# Patient Record
Sex: FEMALE | Race: WHITE | NOT HISPANIC OR LATINO | URBAN - METROPOLITAN AREA
[De-identification: names, ages, dates, MRNs, and addresses within clinical notes are randomized per-mention and may not be internally consistent; named-entity substitution may affect disease eponyms.]

---

## 2019-04-29 ENCOUNTER — EMERGENCY (EMERGENCY)
Facility: HOSPITAL | Age: 74
LOS: 0 days | Discharge: HOME | End: 2019-04-30
Admitting: HOSPITALIST

## 2019-04-29 ENCOUNTER — INPATIENT (INPATIENT)
Facility: HOSPITAL | Age: 74
LOS: 0 days | Discharge: HOME | End: 2019-04-30
Attending: HOSPITALIST | Admitting: HOSPITALIST
Payer: MEDICARE

## 2019-04-29 ENCOUNTER — TRANSCRIPTION ENCOUNTER (OUTPATIENT)
Age: 74
End: 2019-04-29

## 2019-04-29 VITALS
DIASTOLIC BLOOD PRESSURE: 74 MMHG | SYSTOLIC BLOOD PRESSURE: 139 MMHG | HEART RATE: 72 BPM | TEMPERATURE: 98 F | RESPIRATION RATE: 18 BRPM | OXYGEN SATURATION: 97 % | WEIGHT: 154.98 LBS

## 2019-04-29 DIAGNOSIS — C90.00 MULTIPLE MYELOMA NOT HAVING ACHIEVED REMISSION: ICD-10-CM

## 2019-04-29 DIAGNOSIS — R55 SYNCOPE AND COLLAPSE: ICD-10-CM
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
## 2019-04-29 DIAGNOSIS — S02.2XXA FRACTURE OF NASAL BONES, INITIAL ENCOUNTER FOR CLOSED FRACTURE: ICD-10-CM

## 2019-04-29 DIAGNOSIS — Z85.3 PERSONAL HISTORY OF MALIGNANT NEOPLASM OF BREAST: ICD-10-CM

## 2019-04-29 DIAGNOSIS — Z90.11 ACQUIRED ABSENCE OF RIGHT BREAST AND NIPPLE: Chronic | ICD-10-CM

## 2019-04-29 DIAGNOSIS — Z96.659 PRESENCE OF UNSPECIFIED ARTIFICIAL KNEE JOINT: Chronic | ICD-10-CM

## 2019-04-29 DIAGNOSIS — Z85.820 PERSONAL HISTORY OF MALIGNANT MELANOMA OF SKIN: ICD-10-CM
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
## 2019-04-29 DIAGNOSIS — I26.99 OTHER PULMONARY EMBOLISM WITHOUT ACUTE COR PULMONALE: ICD-10-CM

## 2019-04-29 LAB
ALBUMIN SERPL ELPH-MCNC: 4 G/DL — SIGNIFICANT CHANGE UP (ref 3.5–5.2)
ALP SERPL-CCNC: 105 U/L — SIGNIFICANT CHANGE UP (ref 30–115)
ALT FLD-CCNC: 32 U/L — SIGNIFICANT CHANGE UP (ref 0–41)
ANION GAP SERPL CALC-SCNC: 15 MMOL/L — HIGH (ref 7–14)
APTT BLD: 23.8 SEC — CRITICAL LOW (ref 27–39.2)
AST SERPL-CCNC: 26 U/L — SIGNIFICANT CHANGE UP (ref 0–41)
BASOPHILS # BLD AUTO: 0.03 K/UL — SIGNIFICANT CHANGE UP (ref 0–0.2)
BASOPHILS NFR BLD AUTO: 0.3 % — SIGNIFICANT CHANGE UP (ref 0–1)
BILIRUB SERPL-MCNC: 0.5 MG/DL — SIGNIFICANT CHANGE UP (ref 0.2–1.2)
BLD GP AB SCN SERPL QL: SIGNIFICANT CHANGE UP
BUN SERPL-MCNC: 34 MG/DL — HIGH (ref 10–20)
CALCIUM SERPL-MCNC: 9.1 MG/DL — SIGNIFICANT CHANGE UP (ref 8.5–10.1)
CHLORIDE SERPL-SCNC: 104 MMOL/L — SIGNIFICANT CHANGE UP (ref 98–110)
CO2 SERPL-SCNC: 22 MMOL/L — SIGNIFICANT CHANGE UP (ref 17–32)
CREAT SERPL-MCNC: 1.3 MG/DL — SIGNIFICANT CHANGE UP (ref 0.7–1.5)
EOSINOPHIL # BLD AUTO: 0.16 K/UL — SIGNIFICANT CHANGE UP (ref 0–0.7)
EOSINOPHIL NFR BLD AUTO: 1.4 % — SIGNIFICANT CHANGE UP (ref 0–8)
GLUCOSE SERPL-MCNC: 98 MG/DL — SIGNIFICANT CHANGE UP (ref 70–99)
HCT VFR BLD CALC: 31.4 % — LOW (ref 37–47)
HGB BLD-MCNC: 9.9 G/DL — LOW (ref 12–16)
IMM GRANULOCYTES NFR BLD AUTO: 2.3 % — HIGH (ref 0.1–0.3)
INR BLD: 1.03 RATIO — SIGNIFICANT CHANGE UP (ref 0.65–1.3)
LACTATE SERPL-SCNC: 2.2 MMOL/L — SIGNIFICANT CHANGE UP (ref 0.5–2.2)
LYMPHOCYTES # BLD AUTO: 26.8 % — SIGNIFICANT CHANGE UP (ref 20.5–51.1)
LYMPHOCYTES # BLD AUTO: 3.04 K/UL — SIGNIFICANT CHANGE UP (ref 1.2–3.4)
MCHC RBC-ENTMCNC: 28.1 PG — SIGNIFICANT CHANGE UP (ref 27–31)
MCHC RBC-ENTMCNC: 31.5 G/DL — LOW (ref 32–37)
MCV RBC AUTO: 89.2 FL — SIGNIFICANT CHANGE UP (ref 81–99)
MONOCYTES # BLD AUTO: 0.92 K/UL — HIGH (ref 0.1–0.6)
MONOCYTES NFR BLD AUTO: 8.1 % — SIGNIFICANT CHANGE UP (ref 1.7–9.3)
NEUTROPHILS # BLD AUTO: 6.92 K/UL — HIGH (ref 1.4–6.5)
NEUTROPHILS NFR BLD AUTO: 61.1 % — SIGNIFICANT CHANGE UP (ref 42.2–75.2)
NRBC # BLD: 0 /100 WBCS — SIGNIFICANT CHANGE UP (ref 0–0)
NT-PROBNP SERPL-SCNC: 955 PG/ML — HIGH (ref 0–300)
PLATELET # BLD AUTO: 94 K/UL — LOW (ref 130–400)
POTASSIUM SERPL-MCNC: 3.5 MMOL/L — SIGNIFICANT CHANGE UP (ref 3.5–5)
POTASSIUM SERPL-SCNC: 3.5 MMOL/L — SIGNIFICANT CHANGE UP (ref 3.5–5)
PROT SERPL-MCNC: 5.9 G/DL — LOW (ref 6–8)
PROTHROM AB SERPL-ACNC: 11.8 SEC — SIGNIFICANT CHANGE UP (ref 9.95–12.87)
RBC # BLD: 3.52 M/UL — LOW (ref 4.2–5.4)
RBC # FLD: 17.3 % — HIGH (ref 11.5–14.5)
SODIUM SERPL-SCNC: 141 MMOL/L — SIGNIFICANT CHANGE UP (ref 135–146)
TROPONIN T SERPL-MCNC: 0.03 NG/ML — CRITICAL HIGH
TYPE + AB SCN PNL BLD: SIGNIFICANT CHANGE UP
WBC # BLD: 11.33 K/UL — HIGH (ref 4.8–10.8)
WBC # FLD AUTO: 11.33 K/UL — HIGH (ref 4.8–10.8)

## 2019-04-29 PROCEDURE — 71275 CT ANGIOGRAPHY CHEST: CPT | Mod: 26

## 2019-04-29 PROCEDURE — 70450 CT HEAD/BRAIN W/O DYE: CPT | Mod: 26

## 2019-04-29 PROCEDURE — 71045 X-RAY EXAM CHEST 1 VIEW: CPT | Mod: 26

## 2019-04-29 PROCEDURE — 99285 EMERGENCY DEPT VISIT HI MDM: CPT | Mod: GC

## 2019-04-29 PROCEDURE — 70486 CT MAXILLOFACIAL W/O DYE: CPT | Mod: 26

## 2019-04-29 NOTE — ED PROVIDER NOTE - CARE PLAN
Principal Discharge DX:	Pulmonary embolism  Secondary Diagnosis:	Syncope  Secondary Diagnosis:	Nasal bone fracture

## 2019-04-29 NOTE — ED PROVIDER NOTE - PROGRESS NOTE DETAILS
Endorsed to Dr Yin to follow up imaging studies, reassess and dispo. Pt was signed out to me by Dr. Chaudhari at [id number].  All workup prior to this time was initiated by that provider.  Plan to review labs and imaging and reassess. Pt with R lung PEs.  Pt aware of need for admission and blood thinners.  Also told about possible pulmonary infarct on L and nasal bone fractures.  No h/o GIB. No current BRBPR, melena, or hematemesis.

## 2019-04-29 NOTE — ED PROVIDER NOTE - ATTENDING CONTRIBUTION TO CARE
74 yo female PMH as noted here for evaluation of facial ecchymosis s/p syncope /fall a few days ago in [place], also reported non-bloody diarrhea .  Patient reports feeling lightheaded, tried to hold on to a chair, but passed out and found herself of the floor.  Had a similar episode a few months ago under similar circumstances, reports having an echo and a stress test last December and they were reportedly negative.  Denies having any CP or palpitations, but reports exertional SOB which has been present for months.  Recent lower extremity duplex was negative for clots , as reported by the patient.  Well-appearing elderly female, smiling, NAD, PERRL, supple neck without midline spine ttp, + rt sided facial ecchymosis, but no palpable fx, nml work of breathing, speaking full sentences, RRR, abdomen soft, NT/ ND, no calf ttp, distal pulses intact.  Will check labs, obtain imaging studies, reassess.

## 2019-04-29 NOTE — ED PROVIDER NOTE - CLINICAL SUMMARY MEDICAL DECISION MAKING FREE TEXT BOX
pt with intermittent sob and recent syncopal episode with fall just prior to coming here for a visit.  Pt with h/o MM on chemo.  Found to have PEs here in R lung, possible L sided infarct and nasal bone fracture most likely related to her recent syncopal event.  Pt started on anticoagulant and admitted for continued care.

## 2019-04-29 NOTE — ED PROVIDER NOTE - OBJECTIVE STATEMENT
74 yo F pmh of breast ca, multiple myeloma, melanoma presents after a syncopal episodes. States that she lives in [place]. On saturday she was in [place] at home when she syncopized and landed onto the right side of her face. She did not seek help and got on her originally planned flight to [place] to visit her sister. Since the flight she has bruising to the right side of her face. Has been having intermittent shortness of breath, as well as non bloody diarrhea. no fevers, no cp, no palpitations. no subsequent episodes of syncope. Called her oncologist in [place] who advised her to come to the ED.

## 2019-04-29 NOTE — ED ADULT NURSE NOTE - NSIMPLEMENTINTERV_GEN_ALL_ED
Implemented All Fall with Harm Risk Interventions:  [place] to call system. Call bell, personal items and telephone within reach. Instruct patient to call for assistance. Room bathroom lighting operational. Non-slip footwear when patient is off stretcher. Physically safe environment: no spills, clutter or unnecessary equipment. Stretcher in lowest position, wheels locked, appropriate side rails in place. Provide visual cue, wrist band, yellow gown, etc. Monitor gait and stability. Monitor for mental status changes and reorient to person, place, and time. Review medications for side effects contributing to fall risk. Reinforce activity limits and safety measures with patient and family. Provide visual clues: red socks.

## 2019-04-29 NOTE — ED PROVIDER NOTE - NS ED ROS FT
Eyes:  No visual changes, eye pain or discharge.  ENMT:  no sore throat or runny nose, no difficulty swallowing., + bruising to the right side of her face.   Cardiac:  No chest pain, SOB   Respiratory:  No cough or respiratory distress.    GI:  No nausea, vomiting, or abdominal pain. + diarrhea x 3 days non bloody   :  No dysuria, frequency or burning.  MS:  + right leg swelling x 10 days  Neuro:  No headache or weakness.  No LOC. episode of syncope 3 days ago, no dizziness or headaches since.   Skin:  +  bruising to the right side of her face.   Endocrine: No history of thyroid disease or diabetes.

## 2019-04-29 NOTE — ED ADULT TRIAGE NOTE - CHIEF COMPLAINT QUOTE
Pt sts she has bilateral lympadema to arms, multiple myeloma and breast CA and is currently being treated with chemotherapy; pt sts that on Saturday morning she got lightheaded and passed out, hitting face causing bruising to right cheek/eye; pt is not on blood thinners; pt sts she went to urgent care center today because she felt weak and they sent her to ED

## 2019-04-29 NOTE — ED PROVIDER NOTE - PHYSICAL EXAMINATION
CONSTITUTIONAL: Well-developed; well-nourished; in no acute distress.   SKIN: + ecchymosis to the right face.   HEAD: Normocephalic; atraumatic.  EYES: PERRL, EOMI, no conjunctival erythema  ENT: No nasal discharge; airway clear.  NECK: Supple; non tender.  CARD: S1, S2 normal;  Regular rate and rhythm.   RESP: No wheezes, rales or rhonchi.  ABD: soft non tender, non distended, no rebound or guarding  EXT: Normal ROM.  5/5 strength in all 4 extremities. + right lower extremity edema, mild calf tenderness   LYMPH: No acute cervical adenopathy.  NEURO: Alert, oriented, grossly unremarkable. cn 2-11 intact, equal sensation bilaterally

## 2019-04-30 ENCOUNTER — TRANSCRIPTION ENCOUNTER (OUTPATIENT)
Age: 74
End: 2019-04-30

## 2019-04-30 VITALS
TEMPERATURE: 97 F | DIASTOLIC BLOOD PRESSURE: 60 MMHG | HEART RATE: 66 BPM | OXYGEN SATURATION: 94 % | SYSTOLIC BLOOD PRESSURE: 146 MMHG | RESPIRATION RATE: 18 BRPM

## 2019-04-30 DIAGNOSIS — I26.99 OTHER PULMONARY EMBOLISM WITHOUT ACUTE COR PULMONALE: ICD-10-CM

## 2019-04-30 DIAGNOSIS — R09.89 OTHER SPECIFIED SYMPTOMS AND SIGNS INVOLVING THE CIRCULATORY AND RESPIRATORY SYSTEMS: ICD-10-CM

## 2019-04-30 LAB
CK MB CFR SERPL CALC: 2.5 NG/ML — SIGNIFICANT CHANGE UP (ref 0.6–6.3)
CK SERPL-CCNC: 226 U/L — HIGH (ref 0–225)
HCV AB S/CO SERPL IA: 0.05 S/CO — SIGNIFICANT CHANGE UP (ref 0–0.99)
HCV AB SERPL-IMP: SIGNIFICANT CHANGE UP
TROPONIN T SERPL-MCNC: 0.01 NG/ML — SIGNIFICANT CHANGE UP

## 2019-04-30 PROCEDURE — 93306 TTE W/DOPPLER COMPLETE: CPT | Mod: 26

## 2019-04-30 PROCEDURE — 93970 EXTREMITY STUDY: CPT | Mod: 26

## 2019-04-30 PROCEDURE — 99282 EMERGENCY DEPT VISIT SF MDM: CPT

## 2019-04-30 RX ORDER — ASPIRIN/CALCIUM CARB/MAGNESIUM 324 MG
81 TABLET ORAL DAILY
Qty: 0 | Refills: 0 | Status: DISCONTINUED | OUTPATIENT
Start: 2019-04-30 | End: 2019-04-30

## 2019-04-30 RX ORDER — FUROSEMIDE 40 MG
20 TABLET ORAL DAILY
Qty: 0 | Refills: 0 | Status: DISCONTINUED | OUTPATIENT
Start: 2019-04-30 | End: 2019-04-30

## 2019-04-30 RX ORDER — ENOXAPARIN SODIUM 100 MG/ML
70 INJECTION SUBCUTANEOUS ONCE
Qty: 0 | Refills: 0 | Status: COMPLETED | OUTPATIENT
Start: 2019-04-30 | End: 2019-04-30

## 2019-04-30 RX ORDER — FUROSEMIDE 40 MG
1 TABLET ORAL
Qty: 0 | Refills: 0 | COMMUNITY

## 2019-04-30 RX ORDER — BORTEZOMIB 2.5 MG/1
0 INJECTION INTRAVENOUS
Qty: 0 | Refills: 0 | COMMUNITY

## 2019-04-30 RX ORDER — PANTOPRAZOLE SODIUM 20 MG/1
40 TABLET, DELAYED RELEASE ORAL
Qty: 0 | Refills: 0 | Status: DISCONTINUED | OUTPATIENT
Start: 2019-04-30 | End: 2019-04-30

## 2019-04-30 RX ORDER — DEXAMETHASONE 0.5 MG/5ML
0 ELIXIR ORAL
Qty: 0 | Refills: 0 | COMMUNITY

## 2019-04-30 RX ORDER — ENOXAPARIN SODIUM 100 MG/ML
70 INJECTION SUBCUTANEOUS
Qty: 0 | Refills: 0 | Status: DISCONTINUED | OUTPATIENT
Start: 2019-04-30 | End: 2019-04-30

## 2019-04-30 RX ORDER — LENALIDOMIDE 5 MG/1
0 CAPSULE ORAL
Qty: 0 | Refills: 0 | COMMUNITY

## 2019-04-30 RX ORDER — ASPIRIN/CALCIUM CARB/MAGNESIUM 324 MG
1 TABLET ORAL
Qty: 0 | Refills: 0 | COMMUNITY

## 2019-04-30 RX ORDER — APIXABAN 2.5 MG/1
2 TABLET, FILM COATED ORAL
Qty: 120 | Refills: 0 | OUTPATIENT
Start: 2019-04-30 | End: 2019-05-29

## 2019-04-30 RX ADMIN — ENOXAPARIN SODIUM 70 MILLIGRAM(S): 100 INJECTION SUBCUTANEOUS at 02:42

## 2019-04-30 RX ADMIN — Medication 81 MILLIGRAM(S): at 11:24

## 2019-04-30 RX ADMIN — Medication 20 MILLIGRAM(S): at 06:16

## 2019-04-30 RX ADMIN — PANTOPRAZOLE SODIUM 40 MILLIGRAM(S): 20 TABLET, DELAYED RELEASE ORAL at 06:16

## 2019-04-30 NOTE — H&P ADULT - NSHPLABSRESULTS_GEN_ALL_CORE
9.9    11.33 )-----------( 94       ( 29 Apr 2019 21:00 )             31.4       04-29    141  |  104  |  34<H>  ----------------------------<  98  3.5   |  22  |  1.3    Ca    9.1      29 Apr 2019 21:00    TPro  5.9<L>  /  Alb  4.0  /  TBili  0.5  /  DBili  x   /  AST  26  /  ALT  32  /  AlkPhos  105  04-29                  PT/INR - ( 29 Apr 2019 21:00 )   PT: 11.80 sec;   INR: 1.03 ratio         PTT - ( 29 Apr 2019 21:00 )  PTT:23.8 sec    Lactate Trend  04-29 @ 21:00 Lactate:2.2       CARDIAC MARKERS ( 29 Apr 2019 21:00 )  x     / 0.03 ng/mL / x     / x     / x            CAPILLARY BLOOD GLUCOSE            < from: CT Angio Chest w/ IV Cont (04.29.19 @ 23:55) >      IMPRESSION:    Multiple right lower lobe pulmonary emboli. No evidence of right heart   strain.    Left lower lobe peripheral wedge-shaped density, may represent an area of   pulmonary infarct.    < end of copied text >

## 2019-04-30 NOTE — H&P ADULT - NSHPPHYSICALEXAM_GEN_ALL_CORE
PHYSICAL EXAM:  GENERAL: NAD, speaks in full sentences, no signs of respiratory distress  HEAD:  R orbit ecchymoses , Normocephalic  EYES: EOMI, PERRLA, conjunctiva and sclera clear  NECK: Supple, No JVD  CHEST/LUNG: Clear to auscultation bilaterally; No wheeze; No crackles; No accessory muscles used  HEART: Regular rate and rhythm; No murmurs;   ABDOMEN: Soft, Nontender, Nondistended; Bowel sounds present; No guarding  EXTREMITIES:  2+ Peripheral Pulses, No cyanosis or edema  PSYCH: AAOx3  NEUROLOGY: non-focal  SKIN: No rashes or lesions

## 2019-04-30 NOTE — DISCHARGE NOTE PROVIDER - CARE PROVIDER_API CALL
Jayden Ponce)  Critical Care Medicine; Pulmonary Disease; Sleep Medicine  [address] [place] [address]  Phone: [phone]  Fax: [phone]  Follow Up Time:     Marilu Galeas  Phone: (   )    -  Fax: (   )    -  Follow Up Time:

## 2019-04-30 NOTE — DISCHARGE NOTE NURSING/CASE MANAGEMENT/SOCIAL WORK - NSDCDPATPORTLINK_GEN_ALL_CORE
You can access the [facility] Patient Portal, offered by [facility], by registering with the following website: [email or web address]

## 2019-04-30 NOTE — DISCHARGE NOTE PROVIDER - PROVIDER TOKENS
PROVIDER:[TOKEN:[[id number]]],FREE:[LAST:[Jomilya],FIRST:[Marilu],PHONE:[(   )    -],FAX:[(   )    -]]

## 2019-04-30 NOTE — CONSULT NOTE ADULT - ATTENDING COMMENTS
Pt seen and examined at bedside with [facility] resident    Agree with above Consult    Pt endorses remote history of rhinoplasty    right lateral periorbital ecchymosis  EOMI BL , no entrapment  nose midline, nontender, no septal hematoma    CT max face reviewed: no acute nasal fracture    A/P: No acute nasal trauma    -no surgical intervention indicated

## 2019-04-30 NOTE — DISCHARGE NOTE PROVIDER - NSDCCPCAREPLAN_GEN_ALL_CORE_FT
PRINCIPAL DISCHARGE DIAGNOSIS  Diagnosis: Pulmonary embolism  Assessment and Plan of Treatment: Take Eliquis 10mg twice a day for 7 days, then 5mg twice a day   Followup with your oncologist to determine how long to continue      SECONDARY DISCHARGE DIAGNOSES  Diagnosis: Nasal bone fracture  Assessment and Plan of Treatment: Please follow up with Dr. Clay or [facility] in [place] regarding the fracture    Diagnosis: Syncope  Assessment and Plan of Treatment:

## 2019-04-30 NOTE — DISCHARGE NOTE PROVIDER - NSDCCPTREATMENT_GEN_ALL_CORE_FT
PRINCIPAL PROCEDURE  Procedure: CTA chest without then with IV contrast  Findings and Treatment: Multiple right lower lobe emboli, and left lobe wedge shaped density, possible infarct      SECONDARY PROCEDURE  Procedure: CT maxillofacial area  Findings and Treatment: Age indeterminate mildy displaced nasal fracture

## 2019-04-30 NOTE — H&P ADULT - NSHPSOCIALHISTORY_GEN_ALL_CORE
Marital Status:  (  x )    (   ) Single    (   )    (  )   Occupation:   Lives with: (  ) alone  (  ) children   ( x ) spouse   (  ) parents  (  ) other  Recent Travel: yes traveled from [place] to [facility] 4/27/19     Substance Use (street drugs): (  ) never used  (  ) other:  Tobacco Usage:  (   ) never smoked   (   x) former smoker   (   ) current smoker  (     ) pack year  Alcohol Usage: no  Sexual History: no

## 2019-04-30 NOTE — H&P ADULT - HISTORY OF PRESENT ILLNESS
Patient is a 74 yo F PMH  of Breast ca, Multiple Myeloma on Revlimid and Velcade , Melanoma presents for shortness of breath x 2 days. History goes back to Saturday morning where patient fell weak tried to grab onto a chair and passed out. She is unsure of how long she is down for and states she fell forward onto her face. She denied tongue biting, bowel/bladder incontinence, jerking movements, confusion after the fall. Shortly after she took a flight to [place].When she landed she noticed that she was getting short of breath when she exerted herself. When she rested she felt better. The next day her shortness of breath progressed to where if she took 1 or 2 steps she would feels short of breath. Today she was at rest on the couch when she became short of breath. Her sister became worried and took her to an urgent care who recommended that she come to the hospital. She denies chest pain, palpitations, n/v/f/c, abdominal pain, night sweats, weight loss. She did admit to RLE swelling that began about one month ago. She saw her doctor who did a duplex which was negative and she was given compression stocking. She also admits to non bloody diarrhea for the past 2 days

## 2019-04-30 NOTE — CONSULT NOTE ADULT - SUBJECTIVE AND OBJECTIVE BOX
Patient is a 73y old  Female who presents with a chief complaint of Syncope, Shortness of breath (30 Apr 2019 10:19)      HPI:  Patient is a 72 yo F PMH  of Breast ca, Multiple Myeloma on Revlimid and Velcade , Melanoma presents for shortness of breath x 2 days. History goes back to Saturday morning where patient fell weak tried to grab onto a chair and passed out. She is unsure of how long she is down for and states she fell forward onto her face. She denied tongue biting, bowel/bladder incontinence, jerking movements, confusion after the fall. Shortly after she took a flight to [place].When she landed she noticed that she was getting short of breath when she exerted herself. When she rested she felt better. The next day her shortness of breath progressed to where if she took 1 or 2 steps she would feels short of breath. Today she was at rest on the couch when she became short of breath. Her sister became worried and took her to an urgent care who recommended that she come to the hospital. She denies chest pain, palpitations, n/v/f/c, abdominal pain, night sweats, weight loss. She did admit to RLE swelling that began about one month ago. She saw her doctor who did a duplex which was negative and she was given compression stocking. She also admits to non bloody diarrhea for the past 2 days (30 Apr 2019 02:00)    Reports being diagnosed with Myeloma ~Oct last year. Currently on chemo. States symptoms started in [place] on Sat. Felt SOB, weak, fell onto face. Flew to [facility] anyways. Reports worsening symptoms since being here.    PAST MEDICAL & SURGICAL HISTORY:  Melanoma  Multiple myeloma  Breast CA  History of knee replacement  H/O mastectomy, right    SOCIAL HX:   Smoking-remote history, quit in 80's    FAMILY HISTORY:  FH: myocardial infarction  No cardiovascular or pulmonary family history     Review of System:  See HPI    Allergies    Sulfac 10% (Hives)    Intolerances    PHYSICAL EXAM  Vital Signs Last 24 Hrs  T(C): 36.5 (30 Apr 2019 08:05), Max: 36.9 (30 Apr 2019 03:07)  T(F): 97.7 (30 Apr 2019 08:05), Max: 98.4 (30 Apr 2019 03:07)  HR: 65 (30 Apr 2019 08:05) (65 - 72)  BP: 127/72 (30 Apr 2019 08:05) (120/60 - 139/74)  BP(mean): --  RR: 18 (30 Apr 2019 08:05) (18 - 18)  SpO2: 97% (30 Apr 2019 08:05) (97% - 98%)    General: In NAD  HEENT: R eye ecchymosis noted           Lungs: CTAB b/l, no w/r/r  Cardiovascular: Regular  Gastrointestinal: Soft. + BS   Musculoskeletal: No Clubbing. Full range of motion. Moves all extremities  Skin: Warm. Intact  Neurological: No motor or sensory deficit     LABS:                        9.9    11.33 )-----------( 94       ( 29 Apr 2019 21:00 )             31.4                                               04-29    141  |  104  |  34<H>  ----------------------------<  98  3.5   |  22  |  1.3    Ca    9.1      29 Apr 2019 21:00    TPro  5.9<L>  /  Alb  4.0  /  TBili  0.5  /  DBili  x   /  AST  26  /  ALT  32  /  AlkPhos  105  04-29    PT/INR - ( 29 Apr 2019 21:00 )   PT: 11.80 sec;   INR: 1.03 ratio       PTT - ( 29 Apr 2019 21:00 )  PTT:23.8 sec                                           CARDIAC MARKERS ( 30 Apr 2019 05:16 )  x     / 0.01 ng/mL / 226 U/L / x     / 2.5 ng/mL  CARDIAC MARKERS ( 29 Apr 2019 21:00 )  x     / 0.03 ng/mL / x     / x     / x                                                LIVER FUNCTIONS - ( 29 Apr 2019 21:00 )  Alb: 4.0 g/dL / Pro: 5.9 g/dL / ALK PHOS: 105 U/L / ALT: 32 U/L / AST: 26 U/L / GGT: x                                                                                MEDICATIONS  (STANDING):  aspirin  chewable 81 milliGRAM(s) Oral daily  enoxaparin Injectable 70 milliGRAM(s) SubCutaneous two times a day  furosemide    Tablet 20 milliGRAM(s) Oral daily  pantoprazole    Tablet 40 milliGRAM(s) Oral before breakfast    MEDICATIONS  (PRN):

## 2019-04-30 NOTE — CONSULT NOTE ADULT - SUBJECTIVE AND OBJECTIVE BOX
OMFS CONSULT NOTE    Patient: DAVID YEH , 73y ([birth year])Female   MRN: [id number]  Location: [facility] Hold 003 A  Visit: 04-30-19 Inpatient  Date: 04-30-19 @ 10:20    HPI:  74 yo F PMH  of Breast ca, Multiple Myeloma on Revlimid and Velcade , Melanoma presents for shortness of breath x 2 days .Pt states she syncopized on Saturday morning and then took a flight from [place] to [place] that same Saturday and noticed worsening shortness of breath. She was  HD stable on admission. CT Chest showed Multiple right lower lobe pulmonary emboli. No evidence of right heart strain.Left lower lobe peripheral wedge-shaped density, may represent an area of pulmonary infarct. Trauma workup revealed Age-indeterminate minimally displaced bilateral nasal bone fractures.    PAST MEDICAL & SURGICAL HISTORY:  Melanoma  Multiple myeloma  Breast CA  History of knee replacement  H/O mastectomy, right    Home Medications:  aspirin 81 mg oral tablet: 1 tab(s) orally once a day (30 Apr 2019 02:10)  dexamethasone:  (30 Apr 2019 02:09)  Lasix 20 mg oral tablet: 1 tab(s) orally once a day (30 Apr 2019 02:10)  Revlimid:  (30 Apr 2019 02:08)  Velcade 3.5 mg injection:  (30 Apr 2019 02:09)    VITALS:  T(F): 97.7 (04-30-19 @ 08:05), Max: 98.4 (04-30-19 @ 03:07)  HR: 65 (04-30-19 @ 08:05) (65 - 72)  BP: 127/72 (04-30-19 @ 08:05) (120/60 - 139/74)  RR: 18 (04-30-19 @ 08:05) (18 - 18)  SpO2: 97% (04-30-19 @ 08:05) (97% - 98%)    PHYSICAL EXAM:  General: NAD, AAOx3, calm and cooperative  HEENT: Mild right sided periorbital ecchymosis, no nasal discharge, tenderness. no septal hematoma visible     MEDICATIONS  (STANDING):  aspirin  chewable 81 milliGRAM(s) Oral daily  enoxaparin Injectable 70 milliGRAM(s) SubCutaneous two times a day  furosemide    Tablet 20 milliGRAM(s) Oral daily  pantoprazole    Tablet 40 milliGRAM(s) Oral before breakfast    LAB/STUDIES:                        9.9    11.33 )-----------( 94       ( 29 Apr 2019 21:00 )             31.4     04-29    141  |  104  |  34<H>  ----------------------------<  98  3.5   |  22  |  1.3    Ca    9.1      29 Apr 2019 21:00    TPro  5.9<L>  /  Alb  4.0  /  TBili  0.5  /  DBili  x   /  AST  26  /  ALT  32  /  AlkPhos  105  04-29    PT/INR - ( 29 Apr 2019 21:00 )   PT: 11.80 sec;   INR: 1.03 ratio       PTT - ( 29 Apr 2019 21:00 )  PTT:23.8 sec  LIVER FUNCTIONS - ( 29 Apr 2019 21:00 )  Alb: 4.0 g/dL / Pro: 5.9 g/dL / ALK PHOS: 105 U/L / ALT: 32 U/L / AST: 26 U/L / GGT: x           CARDIAC MARKERS ( 30 Apr 2019 05:16 )  x     / 0.01 ng/mL / 226 U/L / x     / 2.5 ng/mL  CARDIAC MARKERS ( 29 Apr 2019 21:00 )  x     / 0.03 ng/mL / x     / x     / x        IMAGING:  CT Maxillofacial No Cont (04.29.19 @ 23:53) >  IMPRESSION:    Age-indeterminate minimally displaced bilateral nasal bone fractures.     ASSESSMENT:  73yF w/ PMHx of Breast ca, Multiple Myeloma on Revlimid and Velcade, Melanoma presents for shortness of breath x 2 days .Pt states she synopsized on Saturday morning and then took a flight from [place] to [place] that same Saturday 4/27, states she fell forward onto her face. since then has been having worsening shortness of breath. She was  HD stable on admission. CT Chest showed Multiple right lower lobe pulmonary emboli. No evidence of right heart strain. Left lower lobe peripheral wedge-shaped density, may represent an area of pulmonary infarct. Trauma workup revealed Age-indeterminate minimally displaced bilateral nasal bone fractures. OMFS Consult called for evaluation.    PLAN:  - No acute surgical intervention   - Sinus precautions.    Above plan discussed with Dr. Clay OMFS CONSULT NOTE    Patient: DAVID YEH , 73y ([birth year])Female   MRN: [id number]  Location: [facility] ER Hold 003 A  Visit: 04-30-19 Inpatient  Date: 04-30-19 @ 10:20    HPI:  74 yo F PMH  of Breast ca, Multiple Myeloma on Revlimid and Velcade , Melanoma presents for shortness of breath x 2 days .Pt states she syncopized on Saturday morning and then took a flight from [place] to [place] that same Saturday and noticed worsening shortness of breath. She was  HD stable on admission. CT Chest showed Multiple right lower lobe pulmonary emboli. No evidence of right heart strain.Left lower lobe peripheral wedge-shaped density, may represent an area of pulmonary infarct. Trauma workup revealed Age-indeterminate minimally displaced bilateral nasal bone fractures.    PAST MEDICAL & SURGICAL HISTORY:  Melanoma  Multiple myeloma  Breast CA  History of knee replacement  H/O mastectomy, right    Home Medications:  aspirin 81 mg oral tablet: 1 tab(s) orally once a day (30 Apr 2019 02:10)  dexamethasone:  (30 Apr 2019 02:09)  Lasix 20 mg oral tablet: 1 tab(s) orally once a day (30 Apr 2019 02:10)  Revlimid:  (30 Apr 2019 02:08)  Velcade 3.5 mg injection:  (30 Apr 2019 02:09)    VITALS:  T(F): 97.7 (04-30-19 @ 08:05), Max: 98.4 (04-30-19 @ 03:07)  HR: 65 (04-30-19 @ 08:05) (65 - 72)  BP: 127/72 (04-30-19 @ 08:05) (120/60 - 139/74)  RR: 18 (04-30-19 @ 08:05) (18 - 18)  SpO2: 97% (04-30-19 @ 08:05) (97% - 98%)    PHYSICAL EXAM:  General: NAD, AAOx3, calm and cooperative  HEENT: Mild right sided periorbital ecchymosis, no nasal discharge, tenderness. no septal hematoma visible     MEDICATIONS  (STANDING):  aspirin  chewable 81 milliGRAM(s) Oral daily  enoxaparin Injectable 70 milliGRAM(s) SubCutaneous two times a day  furosemide    Tablet 20 milliGRAM(s) Oral daily  pantoprazole    Tablet 40 milliGRAM(s) Oral before breakfast    LAB/STUDIES:                        9.9    11.33 )-----------( 94       ( 29 Apr 2019 21:00 )             31.4     04-29    141  |  104  |  34<H>  ----------------------------<  98  3.5   |  22  |  1.3    Ca    9.1      29 Apr 2019 21:00    TPro  5.9<L>  /  Alb  4.0  /  TBili  0.5  /  DBili  x   /  AST  26  /  ALT  32  /  AlkPhos  105  04-29    PT/INR - ( 29 Apr 2019 21:00 )   PT: 11.80 sec;   INR: 1.03 ratio       PTT - ( 29 Apr 2019 21:00 )  PTT:23.8 sec  LIVER FUNCTIONS - ( 29 Apr 2019 21:00 )  Alb: 4.0 g/dL / Pro: 5.9 g/dL / ALK PHOS: 105 U/L / ALT: 32 U/L / AST: 26 U/L / GGT: x           CARDIAC MARKERS ( 30 Apr 2019 05:16 )  x     / 0.01 ng/mL / 226 U/L / x     / 2.5 ng/mL  CARDIAC MARKERS ( 29 Apr 2019 21:00 )  x     / 0.03 ng/mL / x     / x     / x        IMAGING:  CT Maxillofacial No Cont (04.29.19 @ 23:53) >  IMPRESSION:    Age-indeterminate minimally displaced bilateral nasal bone fractures.     ASSESSMENT:  73yF w/ PMHx of Breast ca, Multiple Myeloma on Revlimid and Velcade, Melanoma presents for shortness of breath x 2 days .Pt states she synopsized on Saturday morning and then took a flight from [place] to [place] that same Saturday 4/27, states she fell forward onto her face. since then has been having worsening shortness of breath. She was  HD stable on admission. CT Chest showed Multiple right lower lobe pulmonary emboli. No evidence of right heart strain. Left lower lobe peripheral wedge-shaped density, may represent an area of pulmonary infarct. Trauma workup CT Maxillofacial revealed Age-indeterminate minimally displaced bilateral nasal bone fractures. OMFS Consult called for evaluation. On exam Mild right periorbital ecchymosis, no nasal discharge, or tenderness. no septal hematoma visible.     PLAN:  - No acute surgical intervention   - Sinus precautions.    Above plan discussed with Dr. Clay OMFS CONSULT NOTE    Patient: DAVID YEH , 73y ([birth year])Female   MRN: [id number]  Location: [facility] ER Hold 003 A  Visit: 04-30-19 Inpatient  Date: 04-30-19 @ 10:20    HPI:  74 yo F PMH  of Breast ca, Multiple Myeloma on Revlimid and Velcade , Melanoma presents for shortness of breath x 2 days .Pt states she syncopized on Saturday morning and then took a flight from [place] to [place] that same Saturday and noticed worsening shortness of breath. She was  HD stable on admission. CT Chest showed Multiple right lower lobe pulmonary emboli. No evidence of right heart strain.Left lower lobe peripheral wedge-shaped density, may represent an area of pulmonary infarct. Trauma workup revealed Age-indeterminate minimally displaced bilateral nasal bone fractures.    PAST MEDICAL & SURGICAL HISTORY:  Melanoma  Multiple myeloma  Breast CA  History of knee replacement  H/O mastectomy, right    Home Medications:  aspirin 81 mg oral tablet: 1 tab(s) orally once a day (30 Apr 2019 02:10)  dexamethasone:  (30 Apr 2019 02:09)  Lasix 20 mg oral tablet: 1 tab(s) orally once a day (30 Apr 2019 02:10)  Revlimid:  (30 Apr 2019 02:08)  Velcade 3.5 mg injection:  (30 Apr 2019 02:09)    VITALS:  T(F): 97.7 (04-30-19 @ 08:05), Max: 98.4 (04-30-19 @ 03:07)  HR: 65 (04-30-19 @ 08:05) (65 - 72)  BP: 127/72 (04-30-19 @ 08:05) (120/60 - 139/74)  RR: 18 (04-30-19 @ 08:05) (18 - 18)  SpO2: 97% (04-30-19 @ 08:05) (97% - 98%)    PHYSICAL EXAM:  General: NAD, AAOx3, calm and cooperative  HEENT: Mild right sided periorbital ecchymosis, no nasal discharge, tenderness. no septal hematoma visible     MEDICATIONS  (STANDING):  aspirin  chewable 81 milliGRAM(s) Oral daily  enoxaparin Injectable 70 milliGRAM(s) SubCutaneous two times a day  furosemide    Tablet 20 milliGRAM(s) Oral daily  pantoprazole    Tablet 40 milliGRAM(s) Oral before breakfast    LAB/STUDIES:                        9.9    11.33 )-----------( 94       ( 29 Apr 2019 21:00 )             31.4     04-29    141  |  104  |  34<H>  ----------------------------<  98  3.5   |  22  |  1.3    Ca    9.1      29 Apr 2019 21:00    TPro  5.9<L>  /  Alb  4.0  /  TBili  0.5  /  DBili  x   /  AST  26  /  ALT  32  /  AlkPhos  105  04-29    PT/INR - ( 29 Apr 2019 21:00 )   PT: 11.80 sec;   INR: 1.03 ratio       PTT - ( 29 Apr 2019 21:00 )  PTT:23.8 sec  LIVER FUNCTIONS - ( 29 Apr 2019 21:00 )  Alb: 4.0 g/dL / Pro: 5.9 g/dL / ALK PHOS: 105 U/L / ALT: 32 U/L / AST: 26 U/L / GGT: x           CARDIAC MARKERS ( 30 Apr 2019 05:16 )  x     / 0.01 ng/mL / 226 U/L / x     / 2.5 ng/mL  CARDIAC MARKERS ( 29 Apr 2019 21:00 )  x     / 0.03 ng/mL / x     / x     / x        IMAGING:  CT Maxillofacial No Cont (04.29.19 @ 23:53) >  IMPRESSION:    Age-indeterminate minimally displaced bilateral nasal bone fractures.     ASSESSMENT:  73yF w/ PMHx of Breast ca, Multiple Myeloma on Revlimid and Velcade, Melanoma presents for shortness of breath x 2 days .Pt states she synopsized on Saturday morning and then took a flight from [place] to [place] that same Saturday 4/27, states she fell forward onto her face. since then has been having worsening shortness of breath. She was  HD stable on admission. CT Chest showed Multiple right lower lobe pulmonary emboli. No evidence of right heart strain. Left lower lobe peripheral wedge-shaped density, may represent an area of pulmonary infarct. Trauma workup CT Maxillofacial revealed Age-indeterminate minimally displaced bilateral nasal bone fractures. OMFS Consult called for evaluation. On exam Mild right periorbital ecchymosis, no nasal discharge, or tenderness. no septal hematoma visible.     PLAN:  - No acute surgical intervention   - Please obtain CT Maxillofacial with 3D recon.  - Sinus precautions.    Above plan discussed with Dr. Clay

## 2019-04-30 NOTE — DISCHARGE NOTE PROVIDER - NSDCFUADDINST_GEN_ALL_CORE_FT
If you have worsening shortness of breath, leg swelling, loss of consciousness or significant bleeding please return to the ED or call your physician.  Take your Eliquis as prescribed (10mg twice a day for 7 days, then 5mg twice a day), hold aspirin until you discuss with your primary care.

## 2019-04-30 NOTE — CONSULT NOTE ADULT - ASSESSMENT
72 yo F PMH  of Breast ca, Multiple Myeloma on Revlimid and Velcade, Melanoma presents for shortness of breath x 2 days. CTA showed multiple R-sided pulmonary emboli.    Impression:  Pulmonary embolism, sub-massive, provoked    - Rx therapeutic Lovenox BID  - needs to follow with Pulmonologist and Oncologist when in [place]    ATTENDING NOTE TO FOLLOW. 72 yo F PMH  of Breast ca, Multiple Myeloma on Revlimid and Velcade, Melanoma presents for shortness of breath x 2 days. CTA showed multiple R-sided pulmonary emboli.    Impression:  Pulmonary embolism, sub-massive, provoked    - Rx therapeutic Lovenox BID  - needs to follow with Pulmonologist and Oncologist when in [place]. Spoke to Dr Marilu Galeas and gave update.  - recommend avoid flying for 2 weeks    ATTENDING NOTE TO FOLLOW. Impression:    Pulmonary embolism  HO MM and Breast Cancer SP mastectomy    Recommendations:    - Eliquis for now   - needs to follow with Pulmonologist and Oncologist when in [place]. Spoke to Dr Marilu Galeas and gave update.  - OOB to chair

## 2019-04-30 NOTE — DISCHARGE NOTE PROVIDER - HOSPITAL COURSE
Patient is a 74 yo F PMH  of Breast ca, Multiple Myeloma on Revlimid and Velcade , Melanoma presents for shortness of breath x 2 days and found to have multiple R lung PEs. CT did not show evidence of right heart strain, troponins were negative x2, and BNP was ~900. Patient O2 saturation on room air is 98-99 and other vitals remain stable. She was seen by pulmonology, and benefits and risks of lovenox and Eliquis were discussed, with patient agreeing to Eliquis. Additionally, risks of flying within 1 week were also discussed. Pt was also found to have an age-indeterminate mildly displaced bilateral nasal fracture. Seen by CHASITY, recommended outpatient followup. Patient is a 74 yo F PMH  of Breast ca, Multiple Myeloma on Revlimid and Velcade , Melanoma presents for shortness of breath x 2 days and found to have multiple R lung PEs. CT did not show evidence of right heart strain, troponins were negative x2, and BNP was ~900. Patient O2 saturation on room air is 98-99 and other vitals remain stable. She was seen by pulmonology, and benefits and risks of lovenox and Eliquis were discussed, with patient agreeing to Eliquis. Additionally, risks of flying within 1 week were also discussed. Pt's CT maxillofacial read as an age-indeterminate mildly displaced bilateral nasal fracture. Seen by [facility], recommended outpatient followup. LE duplex negative.

## 2019-04-30 NOTE — H&P ADULT - ATTENDING COMMENTS
Pt seen and examined independently. Patient denies SOB, chest pain. Has right LE pain. Diarrhea modest improvement. No sick contacts, no abdominal pain.     PHYSICAL EXAM:  GENERAL: NAD, speaks in full sentences, no signs of respiratory distress  HEAD:  Atraumatic, Normocephalic  EYES: EOMI, PERRLA, conjunctiva and sclera clear  NECK: Supple, No JVD  CHEST/LUNG: Clear to auscultation bilaterally; No wheeze; No crackles; No accessory muscles used  HEART: Regular rate and rhythm; No murmurs;   ABDOMEN: Soft, Nontender, Nondistended; Bowel sounds present; No guarding  EXTREMITIES:  2+ Peripheral Pulses, No cyanosis or edema  PSYCH: AAOx3  NEUROLOGY: non-focal  SKIN: No rashes or lesions    Pulmonary embolism  -venous duplex negative for  DVT  -c/w lovenox  -d/w patient-she prefers oral medications-discussed NOACs not studied in cancer patients with clots but many patients have had success-she will decide  -outpt oncology follow up  -d/c tele  -echo unremarkable    Syncope  -likely due to PE  -no further episodes  -ekg unremarkable    Nasal fracture  -age indeterminate  -minimally displaced  -outpt OMFS follow up    Leukocytosis likely reactive to fall and PE    Once a/c set up pt may be discharged home. Should avoid flying for 1 week.

## 2019-04-30 NOTE — H&P ADULT - ASSESSMENT
Patient is a 74 yo F PMH  of Breast ca, Multiple Myeloma on Revlimid and Velcade , Melanoma presents for shortness of breath x 2 days .Pt states she syncopized on Saturday morning and then took a flight from [place] to [place] that same Saturday and noticed worsening shortness of breath. She was  HD stable on admission. CT Chest showed Multiple right lower lobe pulmonary emboli. No evidence of right heart strain.Left lower lobe peripheral wedge-shaped density, may represent an area of pulmonary infarct.    1) Acute Right Lower Lobe PE, possible left lower lobe pulmonary infarct   -Admit to Tele   -EKG NSR  -Started on Lovenox 70mg q12 as Cr Clearance >30   -No evidence of R heart strain on CT   -No oxygen requirement   -Troponin 0.03, , will trend second set of troponins   -Check 2d echo   -Pulmonary consult   -Can likely be switched to eliquis tomorrow   -Check LE duplex     2) Multiple Myeloma  -Instructed by oncologist to hold all her chemo for now and f/u after discharge     3) DVT ppx  -on therapeutic lovenox    4)GI ppx  -started on PPI     5) Code  -Full     6) Dispo  -from [place], would like to return by friday, no needs anticipated Patient is a 72 yo F PMH  of Breast ca, Multiple Myeloma on Revlimid and Velcade , Melanoma presents for shortness of breath x 2 days .Pt states she syncopized on Saturday morning and then took a flight from [place] to [place] that same Saturday and noticed worsening shortness of breath. She was  HD stable on admission. CT Chest showed Multiple right lower lobe pulmonary emboli. No evidence of right heart strain.Left lower lobe peripheral wedge-shaped density, may represent an area of pulmonary infarct. Trauma workup revealed Age-indeterminate minimally displaced bilateral nasal bone fractures.    1) Acute Right Lower Lobe PE, possible left lower lobe pulmonary infarct   -Admit to Tele   -EKG NSR  -Started on Lovenox 70mg q12 as Cr Clearance >30   -No evidence of R heart strain on CT   -No oxygen requirement   -Troponin 0.03, , will trend second set of troponins   -Check 2d echo   -Pulmonary consult   -Can likely be switched to eliquis tomorrow   -Check LE duplex     2) Multiple Myeloma  -Instructed by oncologist to hold all her chemo for now and f/u after discharge     3) bilateral nasal bone fractures.  -OMFS consult       4)DVT ppx  -on therapeutic lovenox    5)GI ppx  -started on PPI     6) Code  -Full     7) Dispo  -from [place], would like to return by friday, no needs anticipated
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
## 2019-05-06 DIAGNOSIS — Z79.899 OTHER LONG TERM (CURRENT) DRUG THERAPY: ICD-10-CM

## 2019-05-06 DIAGNOSIS — S02.2XXA FRACTURE OF NASAL BONES, INITIAL ENCOUNTER FOR CLOSED FRACTURE: ICD-10-CM

## 2019-05-06 DIAGNOSIS — Z96.659 PRESENCE OF UNSPECIFIED ARTIFICIAL KNEE JOINT: ICD-10-CM

## 2019-05-06 DIAGNOSIS — I26.99 OTHER PULMONARY EMBOLISM WITHOUT ACUTE COR PULMONALE: ICD-10-CM

## 2019-05-06 DIAGNOSIS — Z85.820 PERSONAL HISTORY OF MALIGNANT MELANOMA OF SKIN: ICD-10-CM

## 2019-05-06 DIAGNOSIS — Z90.11 ACQUIRED ABSENCE OF RIGHT BREAST AND NIPPLE: ICD-10-CM

## 2019-05-06 DIAGNOSIS — C90.00 MULTIPLE MYELOMA NOT HAVING ACHIEVED REMISSION: ICD-10-CM
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
## 2019-05-06 DIAGNOSIS — Z85.3 PERSONAL HISTORY OF MALIGNANT NEOPLASM OF BREAST: ICD-10-CM

## 2019-05-06 DIAGNOSIS — Z87.891 PERSONAL HISTORY OF NICOTINE DEPENDENCE: ICD-10-CM

## 2021-08-03 ENCOUNTER — TRANSCRIPTION ENCOUNTER (OUTPATIENT)
Age: 76
End: 2021-08-03

## 2021-08-10 ENCOUNTER — TRANSCRIPTION ENCOUNTER (OUTPATIENT)
Age: 76
End: 2021-08-10

## 2022-08-04 ENCOUNTER — EMERGENCY (EMERGENCY)
Facility: HOSPITAL | Age: 77
LOS: 0 days | Discharge: HOME | End: 2022-08-04
Attending: EMERGENCY MEDICINE | Admitting: EMERGENCY MEDICINE

## 2022-08-04 VITALS
DIASTOLIC BLOOD PRESSURE: 64 MMHG | OXYGEN SATURATION: 98 % | TEMPERATURE: 98 F | RESPIRATION RATE: 18 BRPM | SYSTOLIC BLOOD PRESSURE: 141 MMHG | HEART RATE: 81 BPM | WEIGHT: 160.06 LBS

## 2022-08-04 VITALS
HEART RATE: 80 BPM | DIASTOLIC BLOOD PRESSURE: 60 MMHG | OXYGEN SATURATION: 98 % | SYSTOLIC BLOOD PRESSURE: 140 MMHG | RESPIRATION RATE: 18 BRPM
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
## 2022-08-04 DIAGNOSIS — S52.611A DISPLACED FRACTURE OF RIGHT ULNA STYLOID PROCESS, INITIAL ENCOUNTER FOR CLOSED FRACTURE: ICD-10-CM

## 2022-08-04 DIAGNOSIS — Z85.820 PERSONAL HISTORY OF MALIGNANT MELANOMA OF SKIN: ICD-10-CM

## 2022-08-04 DIAGNOSIS — S52.501A UNSPECIFIED FRACTURE OF THE LOWER END OF RIGHT RADIUS, INITIAL ENCOUNTER FOR CLOSED FRACTURE: ICD-10-CM

## 2022-08-04 DIAGNOSIS — Z88.2 ALLERGY STATUS TO SULFONAMIDES: ICD-10-CM

## 2022-08-04 DIAGNOSIS — Z96.659 PRESENCE OF UNSPECIFIED ARTIFICIAL KNEE JOINT: Chronic | ICD-10-CM

## 2022-08-04 DIAGNOSIS — Z85.79 PERSONAL HISTORY OF OTHER MALIGNANT NEOPLASMS OF LYMPHOID, HEMATOPOIETIC AND RELATED TISSUES: ICD-10-CM
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
## 2022-08-04 DIAGNOSIS — M54.6 PAIN IN THORACIC SPINE: ICD-10-CM

## 2022-08-04 DIAGNOSIS — M25.531 PAIN IN RIGHT WRIST: ICD-10-CM

## 2022-08-04 DIAGNOSIS — Z85.3 PERSONAL HISTORY OF MALIGNANT NEOPLASM OF BREAST: ICD-10-CM

## 2022-08-04 DIAGNOSIS — Z90.11 ACQUIRED ABSENCE OF RIGHT BREAST AND NIPPLE: Chronic | ICD-10-CM

## 2022-08-04 DIAGNOSIS — Z79.01 LONG TERM (CURRENT) USE OF ANTICOAGULANTS: ICD-10-CM

## 2022-08-04 DIAGNOSIS — M25.431 EFFUSION, RIGHT WRIST: ICD-10-CM

## 2022-08-04 PROBLEM — C43.9 MALIGNANT MELANOMA OF SKIN, UNSPECIFIED: Chronic | Status: ACTIVE | Noted: 2019-04-30

## 2022-08-04 PROBLEM — C50.919 MALIGNANT NEOPLASM OF UNSPECIFIED SITE OF UNSPECIFIED FEMALE BREAST: Chronic | Status: ACTIVE | Noted: 2019-04-29

## 2022-08-04 PROBLEM — C90.00 MULTIPLE MYELOMA NOT HAVING ACHIEVED REMISSION: Chronic | Status: ACTIVE | Noted: 2019-04-30

## 2022-08-04 LAB
ALBUMIN SERPL ELPH-MCNC: 4.4 G/DL — SIGNIFICANT CHANGE UP (ref 3.5–5.2)
ALP SERPL-CCNC: 122 U/L — HIGH (ref 30–115)
ALT FLD-CCNC: 26 U/L — SIGNIFICANT CHANGE UP (ref 0–41)
ANION GAP SERPL CALC-SCNC: 10 MMOL/L — SIGNIFICANT CHANGE UP (ref 7–14)
AST SERPL-CCNC: 39 U/L — SIGNIFICANT CHANGE UP (ref 0–41)
BASOPHILS # BLD AUTO: 0.04 K/UL — SIGNIFICANT CHANGE UP (ref 0–0.2)
BASOPHILS NFR BLD AUTO: 0.4 % — SIGNIFICANT CHANGE UP (ref 0–1)
BILIRUB SERPL-MCNC: <0.2 MG/DL — SIGNIFICANT CHANGE UP (ref 0.2–1.2)
BUN SERPL-MCNC: 32 MG/DL — HIGH (ref 10–20)
CALCIUM SERPL-MCNC: 9.5 MG/DL — SIGNIFICANT CHANGE UP (ref 8.5–10.1)
CHLORIDE SERPL-SCNC: 99 MMOL/L — SIGNIFICANT CHANGE UP (ref 98–110)
CO2 SERPL-SCNC: 23 MMOL/L — SIGNIFICANT CHANGE UP (ref 17–32)
CREAT SERPL-MCNC: 1.4 MG/DL — SIGNIFICANT CHANGE UP (ref 0.7–1.5)
EGFR: 39 ML/MIN/1.73M2 — LOW
EOSINOPHIL # BLD AUTO: 0.07 K/UL — SIGNIFICANT CHANGE UP (ref 0–0.7)
EOSINOPHIL NFR BLD AUTO: 0.6 % — SIGNIFICANT CHANGE UP (ref 0–8)
GLUCOSE SERPL-MCNC: 139 MG/DL — HIGH (ref 70–99)
HCT VFR BLD CALC: 36 % — LOW (ref 37–47)
HGB BLD-MCNC: 11.4 G/DL — LOW (ref 12–16)
IMM GRANULOCYTES NFR BLD AUTO: 0.4 % — HIGH (ref 0.1–0.3)
LYMPHOCYTES # BLD AUTO: 1.43 K/UL — SIGNIFICANT CHANGE UP (ref 1.2–3.4)
LYMPHOCYTES # BLD AUTO: 13.2 % — LOW (ref 20.5–51.1)
MCHC RBC-ENTMCNC: 28.8 PG — SIGNIFICANT CHANGE UP (ref 27–31)
MCHC RBC-ENTMCNC: 31.7 G/DL — LOW (ref 32–37)
MCV RBC AUTO: 90.9 FL — SIGNIFICANT CHANGE UP (ref 81–99)
MONOCYTES # BLD AUTO: 0.57 K/UL — SIGNIFICANT CHANGE UP (ref 0.1–0.6)
MONOCYTES NFR BLD AUTO: 5.2 % — SIGNIFICANT CHANGE UP (ref 1.7–9.3)
NEUTROPHILS # BLD AUTO: 8.72 K/UL — HIGH (ref 1.4–6.5)
NEUTROPHILS NFR BLD AUTO: 80.2 % — HIGH (ref 42.2–75.2)
NRBC # BLD: 0 /100 WBCS — SIGNIFICANT CHANGE UP (ref 0–0)
PLATELET # BLD AUTO: 270 K/UL — SIGNIFICANT CHANGE UP (ref 130–400)
POTASSIUM SERPL-MCNC: 5.8 MMOL/L — HIGH (ref 3.5–5)
POTASSIUM SERPL-SCNC: 5.8 MMOL/L — HIGH (ref 3.5–5)
PROT SERPL-MCNC: 7.4 G/DL — SIGNIFICANT CHANGE UP (ref 6–8)
RBC # BLD: 3.96 M/UL — LOW (ref 4.2–5.4)
RBC # FLD: 14.1 % — SIGNIFICANT CHANGE UP (ref 11.5–14.5)
SODIUM SERPL-SCNC: 132 MMOL/L — LOW (ref 135–146)
WBC # BLD: 10.87 K/UL — HIGH (ref 4.8–10.8)
WBC # FLD AUTO: 10.87 K/UL — HIGH (ref 4.8–10.8)

## 2022-08-04 PROCEDURE — 73110 X-RAY EXAM OF WRIST: CPT | Mod: 26,RT

## 2022-08-04 PROCEDURE — 25600 CLTX DST RDL FX/EPHYS SEP WO: CPT | Mod: 54

## 2022-08-04 PROCEDURE — 71260 CT THORAX DX C+: CPT | Mod: 26,MA

## 2022-08-04 PROCEDURE — 71046 X-RAY EXAM CHEST 2 VIEWS: CPT | Mod: 26

## 2022-08-04 PROCEDURE — 74177 CT ABD & PELVIS W/CONTRAST: CPT | Mod: 26,MA

## 2022-08-04 PROCEDURE — 99285 EMERGENCY DEPT VISIT HI MDM: CPT | Mod: 57

## 2022-08-04 RX ORDER — ACETAMINOPHEN 500 MG
975 TABLET ORAL ONCE
Refills: 0 | Status: DISCONTINUED | OUTPATIENT
Start: 2022-08-04 | End: 2022-08-04

## 2022-08-04 RX ORDER — ACETAMINOPHEN 500 MG
650 TABLET ORAL ONCE
Refills: 0 | Status: COMPLETED | OUTPATIENT
Start: 2022-08-04 | End: 2022-08-04

## 2022-08-04 RX ADMIN — Medication 650 MILLIGRAM(S): at 18:42

## 2022-08-04 NOTE — ED PROVIDER NOTE - PATIENT PORTAL LINK FT
You can access the FollowMyHealth Patient Portal offered by [facility] by registering at the following website: [email or web address]. By joining [facility]’s FollowMyHealth portal, you will also be able to view your health information using other applications (apps) compatible with our system.
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
## 2022-08-04 NOTE — ED PROVIDER NOTE - WR INTERPRETATION DATE TIME  1
Quality 130: Documentation Of Current Medications In The Medical Record: Current Medications Documented Detail Level: Detailed 04-Aug-2022 18:52

## 2022-08-04 NOTE — ED PROVIDER NOTE - NS ED ATTENDING STATEMENT MOD
This was a shared visit with the KRISTA. I reviewed and verified the documentation and independently performed the documented:
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
## 2022-08-04 NOTE — ED PROVIDER NOTE - PHYSICAL EXAMINATION
Vital Signs: I have reviewed the initial vital signs.  Constitutional: well-nourished, no acute distress, normocephalic  Eyes: PERRLA, EOMI,  clear conjunctiva  ENT: MMM,   Cardiovascular: regular rate, regular rhythm, no murmur appreciated  Respiratory: unlabored respiratory effort, clear to auscultation bilaterally, +ttp left posterior lower ribs, no step off or crepitation   Gastrointestinal: soft, non-tender, non-distended  abdomen, no pulsatile mass  Musculoskeletal: supple neck, no cervical tenderness, pelvis stable, right wrist- +ttp distal radius and ulna with swelling and bruising, no snuffbox tenderness. no proximal radius bony tenderness, right shoulder- good rom , no clavicle tenderness   Integumentary: swelling and bruising right wrist   Neurologic: awake, alert, cranial nerves II-XII grossly intact, extremities’ motor and sensory functions grossly intact, no focal deficits, GCS 15
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
## 2022-08-04 NOTE — ED PROCEDURE NOTE - NS ED ATTENDING STATEMENT MOD
I have seen and examined this patient and fully participated in the care of this patient as the teaching attending.  The service was shared with the KRISTA.  I reviewed and verified the documentation and independently performed the documented:

## 2022-08-05 ENCOUNTER — APPOINTMENT (OUTPATIENT)
Dept: ORTHOPEDIC SURGERY | Facility: CLINIC | Age: 77
End: 2022-08-05

## 2022-08-05 VITALS — HEIGHT: 64 IN | BODY MASS INDEX: 27.31 KG/M2 | WEIGHT: 160 LBS

## 2022-08-05 DIAGNOSIS — Z98.1 ARTHRODESIS STATUS: ICD-10-CM

## 2022-08-05 DIAGNOSIS — Z95.2 PRESENCE OF PROSTHETIC HEART VALVE: ICD-10-CM

## 2022-08-05 DIAGNOSIS — Z78.9 OTHER SPECIFIED HEALTH STATUS: ICD-10-CM

## 2022-08-05 PROBLEM — Z00.00 ENCOUNTER FOR PREVENTIVE HEALTH EXAMINATION: Status: ACTIVE | Noted: 2022-08-05

## 2022-08-05 PROCEDURE — 73110 X-RAY EXAM OF WRIST: CPT | Mod: RT

## 2022-08-05 PROCEDURE — 29075 APPL CST ELBW FNGR SHORT ARM: CPT | Mod: RT

## 2022-08-05 PROCEDURE — 99072 ADDL SUPL MATRL&STAF TM PHE: CPT

## 2022-08-05 PROCEDURE — 99213 OFFICE O/P EST LOW 20 MIN: CPT | Mod: 25

## 2022-08-05 RX ORDER — TRAMADOL HYDROCHLORIDE 50 MG/1
50 TABLET, COATED ORAL
Qty: 20 | Refills: 0 | Status: ACTIVE | COMMUNITY
Start: 2022-08-05 | End: 1900-01-01

## 2022-08-05 NOTE — DISCUSSION/SUMMARY
[de-identified] :   Patient was taken out of the posterior splint that she was placed in at the ER.  With the patient's approval, and under sterile technique, a hematoma block was performed by injecting 12 cc of lidocaine towards the fracture site.  Patient tolerated the procedure well.\par \par She was then placed in a well fitted and well molded short-arm waterproof, fiberglass cast.  Encouraged patient to move her fingers and elbow while in the cast.\par \par Tramadol 50 mg was sent to the patient's pharmacy to help alleviate her symptoms.  Patient will follow-up in 2 weeks for further evaluation.  All of the patient's questions/concerns were answered in detail.  \par \par The patient was seen under the supervision of Dr. Wu.\par \par

## 2022-08-05 NOTE — PHYSICAL EXAM
[Right] : right hand [Dorsal Wrist] : dorsal wrist [Volar Wrist] : volar wrist [Distal Radius] : distal radius [Radial Styloid] : radial styloid [Ulna Styloid] : ulna styloid [UJ] : UJ [5___] : pinch 5[unfilled]/5 [] : no palpable mass [[email or web address]] :  limited ROM of wrist secondary to swelling/pain

## 2022-08-05 NOTE — IMAGING
[de-identified] :  X-rays taken of the patient's right wrist at [facility] were reviewed in the office today.  It revealed a impacted/mildly displaced intra-articular fracture of the right distal radius.  Ulnar styloid avulsion fracture also noted.\par \par Post cast x-rays taken in the office today reveal an acceptable alignment of her fracture.
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
## 2022-08-09 ENCOUNTER — APPOINTMENT (OUTPATIENT)
Dept: ORTHOPEDIC SURGERY | Facility: CLINIC | Age: 77
End: 2022-08-09

## 2022-08-19 ENCOUNTER — APPOINTMENT (OUTPATIENT)
Dept: ORTHOPEDIC SURGERY | Facility: CLINIC | Age: 77
End: 2022-08-19

## 2022-08-19 DIAGNOSIS — S52.501A UNSPECIFIED FRACTURE OF THE LOWER END OF RIGHT RADIUS, INITIAL ENCOUNTER FOR CLOSED FRACTURE: ICD-10-CM

## 2022-08-19 PROCEDURE — 99213 OFFICE O/P EST LOW 20 MIN: CPT

## 2022-08-19 NOTE — IMAGING
[de-identified] : On examination of her right wrist she is currently in a short-arm waterproof cast.  The cast is intact, it is fitting well.  She has no swelling of the fingers.  Sensation is intact all the fingers.  Good brisk capillary refill.  She has full range of motion of the elbow.\par \par  X-rays repeated in the office today of the right wrist show slight displacement of the dorsal aspect of the distal radius on the lateral view when compared to the previous x-rays.

## 2022-08-19 NOTE — DISCUSSION/SUMMARY
[de-identified] :   At this time I discussed with the patient I would recommend she remain in the short-arm cast.  I will review her images with Dr. Pitt to discuss further plan of care, if she would recommend leaving this alone or if she would recommend surgery.  Patient states she is returning to [place] on Thursday.  She will have to follow up with an orthopedist there.  She would still like to know Dr. Pitt opinion so I will call her on Monday to discuss after Dr. Pitt reviews her images since she is no longer in the office today. Patient will call me if any other problems or concerns.  Patient verbalized understanding and agreed with the plan, all questions were answered in the office today.\par

## 2022-08-19 NOTE — HISTORY OF PRESENT ILLNESS
[de-identified] :  77-year-old female is here today for follow-up of her right wrist.  She fell 2 weeks ago and had a displaced fracture of her distal radius.  Her fracture was reduced and she was placed in a well fitted well-molded waterproof short-arm cast.  She states she is feeling better but she still has pain in the wrist.  She denies any new injury or trauma.  She denies any numbness tingling in the hand or fingers.

## 2022-11-17 NOTE — REASON FOR VISIT
[FreeTextEntry2] : Right wrist fracture Clindamycin Pregnancy And Lactation Text: This medication can be used in pregnancy if certain situations. Clindamycin is also present in breast milk.

## 2022-12-05 ENCOUNTER — FORM ENCOUNTER (OUTPATIENT)
Age: 77
End: 2022-12-05

## 2023-12-13 NOTE — ED ADULT NURSE NOTE - HOW OFTEN DO YOU HAVE A DRINK CONTAINING ALCOHOL?
Madina Cruz was seen and treated in our emergency department on 12/13/2023.  She may return to work on 12/16/2023.       If you have any questions or concerns, please don't hesitate to call.      Dominik Saxena MD
Never

## 2024-04-04 NOTE — DISCHARGE NOTE PROVIDER - NSDCHOSPICE_GEN_A_CORE
Patient rang [facility]z and this RN answered. Pt asking if he can take his home dose of Meclizine 25mg due to vertigo. This RN asked MD Kathy if patient is able to take home medication and provider gives verbal okay for medication to be taken. Pt took home dose of medication.     Sheryl Bagley RN  04/04/24 [id number]    
No

## 2024-08-08 NOTE — ED ADULT NURSE NOTE - NSFALLRSKASSESSTYPE_ED_ALL_ED
Quality 130: Documentation Of Current Medications In The Medical Record: Current Medications Documented Detail Level: Detailed Initial (On Arrival)